# Patient Record
Sex: MALE | Race: WHITE
[De-identification: names, ages, dates, MRNs, and addresses within clinical notes are randomized per-mention and may not be internally consistent; named-entity substitution may affect disease eponyms.]

---

## 2016-05-04 VITALS — SYSTOLIC BLOOD PRESSURE: 138 MMHG | DIASTOLIC BLOOD PRESSURE: 88 MMHG

## 2018-03-01 ENCOUNTER — HOSPITAL ENCOUNTER (OUTPATIENT)
Dept: HOSPITAL 44 - RAD | Age: 63
End: 2018-03-01
Attending: FAMILY MEDICINE
Payer: SELF-PAY

## 2018-03-01 DIAGNOSIS — T14.8XXA: Primary | ICD-10-CM
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
## 2018-03-01 PROCEDURE — 73070 X-RAY EXAM OF ELBOW: CPT

## 2018-03-01 NOTE — DIAGNOSTIC IMAGING REPORT
CLAUDIO CORONEL 

[facility]

[address] [facility] [address] [address]

 

 

 

 

Report Submission Date: Mar 1, 2018 4:54:23 PM CST

Patient       Study

Name:   CLEMENTINE PATEL       Date:   Mar 1, 2018 4:17:23 PM CST

MRN:   [id number]       Modality Type:   DX

Gender:   M       Description:   UPPER EXTREMITY

:   59       Institution:   [facility]

Physician:   CLAUDIO CORONEL

     Accession:    [id number]

 

 

Examination: Plain film left elbow 



History: LT ELBOW, PAIN AND SWELLING IN LEFT ELBOW X9 DAYS, PT UNSURE OF INJURY 
(Hx) 



Comparison exams: None provided 



Findings: 2 views of the left elbow demonstrate normal cortical margins. No 
fracture.  No dislocation.  Radial head is within normal limits. No joint 
effusion 



Impression: No acute osseous abnormality.

 

Electronically signed on Mar 1, 2018 4:54:23 PM CST by:

Renan FUNEZ

## 2018-07-19 ENCOUNTER — HOSPITAL ENCOUNTER (EMERGENCY)
Dept: HOSPITAL 44 - ED | Age: 63
Discharge: HOME | End: 2018-07-19
Payer: SELF-PAY

## 2018-07-19 VITALS — SYSTOLIC BLOOD PRESSURE: 165 MMHG | DIASTOLIC BLOOD PRESSURE: 101 MMHG

## 2018-07-19 DIAGNOSIS — L08.9: ICD-10-CM

## 2018-07-19 DIAGNOSIS — A41.9: Primary | ICD-10-CM

## 2018-07-19 PROCEDURE — 99282 EMERGENCY DEPT VISIT SF MDM: CPT

## 2018-07-19 NOTE — ED PHYSICIAN DOCUMENTATION
Lower Extremity Problem





- HISTORIAN


Historian: patient





- HPI


Stated Complaint: swelling and redness to distal Left foot


Chief Complaint: Lower Extremity Problem


Additional Information: 





exab recurrent infection lt foot leg.  antibitocs reportedly resolve problem 

none for more than 3 months


Location of Injury: L foot


Onset: days ago (2-3)


Timing: persistent since


Duration: intermittent episodes (worse)


Recent Injury: No (none)


Where: home


Severity: mild, moderate


Quality: pain, swelling


Exacerbated By: walking


Relieved By: rest, positioning


Associated Symptoms: denies: chest pain, shortness of breath, rapid heart rate





- ROS


CONST: denies: no problems


MS/SKIN/LYMPH: none


CVS/RESP: none


GI/: none


EYES/ENT: denies: problems with vision





- PAST HX


Past History: other (htn   prev lt foot infection)


Surgeries/Procedures: none


Allergies/Adverse Reactions: 


 Allergies











Allergy/AdvReac Type Severity Reaction Status Date / Time


 


No Known Allergies Allergy   Verified 07/19/18 09:53














Home Medications: 


 Ambulatory Orders











 Medication  Instructions  Recorded


 


Cephalexin [Keflex] 500 mg PO QID #40 capsule 07/19/18














- SOCIAL HX


Smoking History: less than 1 pack/day


Alcohol Use: none


Drug Use: none





- FAMILY HX


Family History: no significant history





- VITAL SIGNS


Vital Signs: 





 Vital Signs











Temp Pulse Resp BP Pulse Ox


 


 97.2 F L  65   18   171/109   99 


 


 07/19/18 08:57  07/19/18 08:57  07/19/18 08:57  07/19/18 08:57  07/19/18 08:57














- REVIEWED ASSESSMENTS


Nursing Assessment  Reviewed: Yes


Vitals Reviewed: Yes





Lower Extremity Problem





- EXAM


General Appearance: mild distress


Foot: left foot: non-tender


Neuro/Tendon: normal sensation, normal motor functions, no evidence tendon 

injury


EENT: eye inspection normal


RESPIRATORY: no resp distress, chest non-tender, breath sounds normal


CVS: reg rate & rhythm, heart sounds normal


JOINT: joints nml


VASCULAR: pulses full/equal


NEURO/PSYCH: oriented X3, motor nml, sensation nml, mood/affect nml


SKIN: warm/dry.  No: normal color (lt foot sig erythematous sl swelling)





Discharge


Clincal Impression: 


 sepsis lt foot





Prescriptions: 


Cephalexin [Keflex] 500 mg PO QID #40 capsule


Referrals: 


Primary Doctor,No [Primary Care Provider] - 2 Days


Condition: Good


Disposition: 01 HOME, SELF-CARE


Decision to Admit: NO


Decision Time: 10:18